# Patient Record
Sex: FEMALE | Race: BLACK OR AFRICAN AMERICAN
[De-identification: names, ages, dates, MRNs, and addresses within clinical notes are randomized per-mention and may not be internally consistent; named-entity substitution may affect disease eponyms.]

---

## 2019-03-20 ENCOUNTER — HOSPITAL ENCOUNTER (EMERGENCY)
Dept: HOSPITAL 72 - EMR | Age: 34
Discharge: HOME | End: 2019-03-20
Payer: COMMERCIAL

## 2019-03-20 VITALS — HEIGHT: 70 IN | BODY MASS INDEX: 27.2 KG/M2 | WEIGHT: 190 LBS

## 2019-03-20 VITALS — SYSTOLIC BLOOD PRESSURE: 128 MMHG | DIASTOLIC BLOOD PRESSURE: 82 MMHG

## 2019-03-20 VITALS — DIASTOLIC BLOOD PRESSURE: 76 MMHG | SYSTOLIC BLOOD PRESSURE: 124 MMHG
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
## 2019-03-20 DIAGNOSIS — F43.10: ICD-10-CM

## 2019-03-20 DIAGNOSIS — S29.011A: ICD-10-CM

## 2019-03-20 DIAGNOSIS — S16.1XXA: Primary | ICD-10-CM

## 2019-03-20 DIAGNOSIS — Z91.040: ICD-10-CM

## 2019-03-20 PROCEDURE — 72040 X-RAY EXAM NECK SPINE 2-3 VW: CPT

## 2019-03-20 PROCEDURE — 71045 X-RAY EXAM CHEST 1 VIEW: CPT

## 2019-03-20 PROCEDURE — 99284 EMERGENCY DEPT VISIT MOD MDM: CPT
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
## 2019-03-20 NOTE — NUR
ED Nurse Note: 



PT SITTING PEACEFULLY IN BED IN NAD. AOX4. PRESCRIPTIONS AND DISCHARGE 
PAPERWORK EXPLAINED TO PT. PT VERBALIZES UNDERSTANDING AND ALL QUESTIONS 
ANSWERED. PRESCRIPTIONS AND DISCHARGE PAPERWORK GIVEN TO PT AND ID [id number] 
REMOVED. PT WALKED OUT OF ER WITH STEADY GAIT AND ALL BELONGINGS.
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
